# Patient Record
(demographics unavailable — no encounter records)

---

## 2024-11-01 NOTE — HISTORY OF PRESENT ILLNESS
[Improving] : improving [7] : a current pain level of 7/10 [Intermit.] : ~He/She~ states the symptoms seem to be intermittent [Sitting] : worsened by sitting [Heat] : relieved by heat [de-identified] : 35 year old female patient being seen today for follow-up of Lower back pain radiating to the right lateral thigh, right lateral lower leg and right foot 10.18.21 35 year old female patient being seen today for follow-up post Lumbar Spine MRI. Patient reports Lower back pain radiating to the right lateral thigh, right lateral lower leg and right foot. Patient described the pain and a shocking pain and rates the pain 7/10. Patient states the pain is worse at nights. Standing and Walking alleviates the pain and sitting makes the pain worse. Patient reports a decrease in pain since her last visit and reports taking prescribed Prednisone and Tizanidine as ordered. Patient denies any associated symptoms of weakness or instability in bilateral lower extremities. Patient denies any bladder or bowel issues. [Ataxia] : no ataxia [Incontinence] : no incontinence [Urinary Ret.] : no urinary retention

## 2024-11-01 NOTE — PHYSICAL EXAM
[de-identified] : Lumbar Physical Exam\par  \par  Gait - Normal\par  \par  Station - Normal\par  \par  Sagittal balance - Normal\par  \par  Compensatory mechanism? - None\par  \par  Heel walk - Normal\par  \par  Toe walk - Normal\par  \par  Reflexes\par  Patellar - normal\par  Gastroc - normal\par  Clonus - No\par  \par  Hip Exam - Normal\par  \par  Straight leg raise - none\par  \par  Pulses - 2+ dp/pt\par  \par  Range of motion - normal\par  \par  Sensation \par  Sensation intact to light touch in L1, L2, L3, L4, L5 and S1 dermatomes bilaterally\par  \par  Motor\par  	IP	Quad	HS	TA	Gastroc	EHL\par  Right	5/5	5/5	5/5	5/5	5/5	5/5\par  Left	5/5	5/5	5/5	5/5	5/5	5/5 [de-identified] : Lumbar radiographs\par  L5-S1 disc degeneration noted\par  No instability on flexion-extension radiographs\par  \par  Lumbar MRI\par  L5-S1 disc protrusion\par  No critical central stenosis\par  No critical foraminal stenosis

## 2024-11-01 NOTE — ASSESSMENT
[FreeTextEntry1] : I will lengthy discussion with the patient in regards to her treatment plan and diagnosis.  Although she has no critical areas of stenosis on her MRI she does have a disc protrusion at L5-S1.  Furthermore she is having significant radicular pain.  I do think she might be an appropriate candidate for an epidural steroid injection.  A referral has been made for this.  She should continue with physical therapy/home exercise program.  I will have her follow-up in approximately 4 weeks for repeat clinical evaluation.  I encouraged her to follow-up sooner if she has any new or worsening symptoms.  Please note that over 30 minutes of time was spent in care of this patient which includes previsit preparation, in person visit, post visit documentation.

## 2024-11-11 NOTE — PHYSICAL EXAM
[FreeTextEntry1] : PE: Constitutional: In NAD, calm and cooperative MSK (Neck): 	Inspection: no gross swelling identified 	Palpation: Tenderness of the left lower cervical paraspinals, trapeizus and levator scapulae, trigger points felt 	ROM: Pain at end cervical extension and with L cervical rotation 	Strength: 5/5 strength in bilateral upper extremities 	Reflexes: 2+ Biceps/Brachioradialis/Triceps/patella/Achilles reflex bilaterally, Hoffmans/Clonus negative bilaterally 	Sensation: Intact to light touch in bilateral upper extremities 	Special tests: Spurlings test negative bilaterally

## 2024-11-11 NOTE — ASSESSMENT
[FreeTextEntry1] : Ms. LEATHA SILVESTRE is a 38 year old female who presents with persistent neck pain since Summer 2024, likely myofascial in nature, less likely due to underlying spondylosis. Denies any red flag signs. Will recommend: - TPI performed today, tolerated well - Will obtain new MRI C Spine - Methocarbamol 750mg TID PRN. Advised of side effects including sedation. - Mobic 15mg PO QD PRN. Patient advised on cardiac/gi/renal side effects. Patient encouraged to take medication with food and not with other NSAIDs. - Start PT 2-3x/week for stretching, strengthening (especially of core muscles), ROM exercises, HEP and modalities PRN including myofascial release, moist heat  RTC after MRI. Patient aware of red flag signs including any changes to their bowel/bladder control, groin numbness or new weakness. Patient knows to seek immediate attention by calling 911 or going to nearest ER if these symptoms appear.  This patient is being managed for a complex chronic pain that requires ongoing medical management. The nature of this condition requires a longitudinal relationship and monitoring over time for appropriate treatment.

## 2024-11-11 NOTE — HISTORY OF PRESENT ILLNESS
[FreeTextEntry1] : Ms. LEATHA SILVESTRE is a 38 year old female who presents for follow up. At last visit, she was ordered an MRI L Spine, given Robaxin/Mobic and started on PT. Her back pain has now resolved, but now complaining of neck pain. She has been doing heat/HEP for over 6 weeks.    Location: Posterior neck, L>R Onset: Provoked pain in Summer 2024 while exercising but got a little better, but then worsening after standing for too long in late 9/2024 Provocation/Palliative: Worse with activity, better with medrol dose thomas/NSAIDs Quality: Stabbing Radiation: One Severity: Moderate to severe Timing: Not improving with time  No bowel/bladder changes. No groin numbness.

## 2024-11-11 NOTE — PROCEDURE
[de-identified] : Procedure: Trigger Point Injections Physician: Dr. Santos Medication injected: Lidocaine 1%, 5cc total Sedation medications: None Estimated blood loss: None Complications: None   Technique: R/B/A to trigger point injection reviewed with patient. The patient is agreeable and wishes to proceed.   The patient was placed in the seated position and trigger points of the left trapezius, levator scapulae and cervical paraspinals were identified. The area was prepped in normal sterile fashion with Chloroprep. A 27 gauge 1.25 inch needle was advanced into the palpable trigger points with reproduction of pain. After negative aspiration of heme, the above medications were injected into the trigger areas. Needle was then removed, bandaid placed over injection sites. There was no complications, the patient was provided with post injection instructions.

## 2024-12-03 NOTE — HISTORY OF PRESENT ILLNESS
[de-identified] : 38-year-old female who presents with complaints of low back pain after sustaining a fall where she slipped down steps landing against step on 11/22/24. Patient reports having Right sided low back pain that radiates to Right lateral thigh, which worsens with ROM or when getting up from seated position. She went to ED x 2 for evaluation, Prednisone taper 50mg x 5 days, Toradol and muscle relaxer with no significant relief. Patient is currently utilizing cane for ambulation. denies change in bb function.  PmHx: HLD, PCOS, FE anemia, Vit D deficiency, anxiety All: NKDA Occupation: Special   Date of Injury/Onset: 11/22/24  Pain:  At Rest: 7/10 With Activity:  9/10 Mechanism of injury: Patient took a fall 2nd to last step in her house and landed on her back, right sided.  Quality of symptoms: sharp stabbing pain, right sided that can radiate into the buttock and hamstring.  Improves with: Prednisone, Baclofen  Worse with: Walking, sitting, sitting to standing, bending  Prior treatment: Epidural- 2021, [facility]- 12/01/24, Dr. hernandez, Dr. Tomas,  Prior Imaging: XR- [facility]  Additional Information: Hx of herniated disc, Ambulating with cane, was using walker at home  Occupation: Teacher

## 2024-12-03 NOTE — ASSESSMENT
[FreeTextEntry1] : 38 F with acute on chronic LBP and spasm. Hx of HNP in past. had recent fall which exacerbated pain. Seen in ER x 2. tried prednisone 50 mg x 5 days without relief. Discussed muscle relaxants discussed opiates patient deferred.  Has required MINI in past Updated MRI L spine to assess HNP and set up for possible MINI

## 2024-12-17 NOTE — DATA REVIEWED
[MRI] : MRI [Lumbar Spine] : lumbar spine [I independently reviewed and interpreted images and report] : I independently reviewed and interpreted images and report [FreeTextEntry1] : L5-s1 hnp

## 2024-12-17 NOTE — HISTORY OF PRESENT ILLNESS
[de-identified] : 12/17/24: Follow up for the lumbar spine. Patient reports pain managment was helpful. Here to review MRI results done at .  12/03/24: 38-year-old female who presents with complaints of low back pain after sustaining a fall where she slipped down steps landing against step on 11/22/24. Patient reports having Right sided low back pain that radiates to Right lateral thigh, which worsens with ROM or when getting up from seated position. She went to ED x 2 for evaluation, Prednisone taper 50mg x 5 days, Toradol and muscle relaxer with no significant relief. Patient is currently utilizing cane for ambulation. denies change in bb function.  PmHx: HLD, PCOS, FE anemia, Vit D deficiency, anxiety All: NKDA Occupation: Special   Date of Injury/Onset: 11/22/24  Pain:  At Rest: 7/10 With Activity:  9/10 Mechanism of injury: Patient took a fall 2nd to last step in her house and landed on her back, right sided.  Quality of symptoms: sharp stabbing pain, right sided that can radiate into the buttock and hamstring.  Improves with: Prednisone, Baclofen  Worse with: Walking, sitting, sitting to standing, bending  Prior treatment: Epidural- 2021, [facility]- 12/01/24, Dr. hernandez, Dr. Tomas,  Prior Imaging: XR- [facility]  Additional Information: Hx of herniated disc, Ambulating with cane, was using walker at home  Occupation: Teacher

## 2024-12-17 NOTE — ASSESSMENT
[FreeTextEntry1] : 38 F with acute on chronic LBP and spasm. L5-s1 H NP Hx of HNP in past. had recent fall which exacerbated pain Pain improved with SI joint injection Being PT NSAIDS FU 6 weeks FU with pain management as scheduled.